# Patient Record
Sex: MALE
[De-identification: names, ages, dates, MRNs, and addresses within clinical notes are randomized per-mention and may not be internally consistent; named-entity substitution may affect disease eponyms.]

---

## 2017-03-27 ENCOUNTER — HOSPITAL ENCOUNTER (OUTPATIENT)
Dept: HOSPITAL 5 - SPVIMAG | Age: 72
Discharge: HOME | End: 2017-03-27
Attending: INTERNAL MEDICINE
Payer: MEDICARE

## 2017-03-27 DIAGNOSIS — M46.97: ICD-10-CM

## 2017-03-27 DIAGNOSIS — M47.897: Primary | ICD-10-CM

## 2017-03-27 PROCEDURE — 72110 X-RAY EXAM L-2 SPINE 4/>VWS: CPT

## 2017-03-27 NOTE — XRAY REPORT
Lumbar spine series:



The traction spur is present predominantly at the L1 and L2 levels as 

well as the L4 and L5 levels. There is mild anterior wedging of L1. The 

interspace between L4 and L5 is moderately narrowed. There is normal 

alignment. There is mild narrowing of the L4-5 and L5-S1 apophyseal 

joints. The bones may be mildly demineralized but no destructive 

lesions identified.



Impressions:



Multilevel degenerative changes as detailed above.

## 2018-07-17 ENCOUNTER — HOSPITAL ENCOUNTER (OUTPATIENT)
Dept: HOSPITAL 5 - SPVIMAG | Age: 73
Discharge: HOME | End: 2018-07-17
Attending: INTERNAL MEDICINE
Payer: MEDICARE

## 2018-07-17 DIAGNOSIS — M25.561: Primary | ICD-10-CM

## 2018-07-18 NOTE — XRAY REPORT
FINAL REPORT



PROCEDURE:  XR KNEE 3V RT



TECHNIQUE:  RIGHT knee radiographs, AP, lateral and sunrise

views. CPT 05845







HISTORY:  RIGHT KNEE PAIN 



COMPARISON:  No prior studies are available for comparison.



FINDINGS:  

Fracture (s) and/or Dislocation(s): None .



Alignment: Normal .



Joint space(s): Normal .



Soft tissues: Normal .



Bone mineralization: Normal .



Foreign bodies: None .







IMPRESSION:  

Normal Examination.